# Patient Record
Sex: FEMALE | Race: BLACK OR AFRICAN AMERICAN | NOT HISPANIC OR LATINO | URBAN - METROPOLITAN AREA
[De-identification: names, ages, dates, MRNs, and addresses within clinical notes are randomized per-mention and may not be internally consistent; named-entity substitution may affect disease eponyms.]

---

## 2023-01-06 ENCOUNTER — OFFICE VISIT (OUTPATIENT)
Dept: URBAN - METROPOLITAN AREA CLINIC 113 | Facility: CLINIC | Age: 43
End: 2023-01-06
Payer: OTHER GOVERNMENT

## 2023-01-06 ENCOUNTER — LAB OUTSIDE AN ENCOUNTER (OUTPATIENT)
Dept: URBAN - METROPOLITAN AREA CLINIC 113 | Facility: CLINIC | Age: 43
End: 2023-01-06

## 2023-01-06 ENCOUNTER — WEB ENCOUNTER (OUTPATIENT)
Dept: URBAN - METROPOLITAN AREA CLINIC 113 | Facility: CLINIC | Age: 43
End: 2023-01-06

## 2023-01-06 VITALS
WEIGHT: 159 LBS | RESPIRATION RATE: 20 BRPM | DIASTOLIC BLOOD PRESSURE: 83 MMHG | HEIGHT: 64 IN | BODY MASS INDEX: 27.14 KG/M2 | HEART RATE: 67 BPM | SYSTOLIC BLOOD PRESSURE: 117 MMHG | TEMPERATURE: 97.3 F

## 2023-01-06 DIAGNOSIS — K64.8 INTERNAL HEMORRHOIDS: ICD-10-CM

## 2023-01-06 DIAGNOSIS — K21.9 GERD WITHOUT ESOPHAGITIS: ICD-10-CM

## 2023-01-06 DIAGNOSIS — D50.0 IRON DEFICIENCY ANEMIA DUE TO CHRONIC BLOOD LOSS: ICD-10-CM

## 2023-01-06 DIAGNOSIS — K62.5 RECTAL BLEEDING: ICD-10-CM

## 2023-01-06 PROCEDURE — 99204 OFFICE O/P NEW MOD 45 MIN: CPT | Performed by: INTERNAL MEDICINE

## 2023-01-06 RX ORDER — METHOTREXATE SODIUM 2.5 MG/1
TAKE 6 TABLETS TABLET ORAL
Status: ACTIVE | COMMUNITY

## 2023-01-06 RX ORDER — FOLIC ACID 1 MG/1
1 TABLET TABLET ORAL ONCE A DAY
Status: ACTIVE | COMMUNITY

## 2023-01-06 RX ORDER — PREDNISONE 5 MG/1
1 TABLET TABLET ORAL ONCE A DAY
Status: ACTIVE | COMMUNITY

## 2023-01-06 RX ORDER — UBIDECARENONE 30 MG
AS DIRECTED CAPSULE ORAL
Status: ACTIVE | COMMUNITY

## 2023-01-06 NOTE — HPI-TODAY'S VISIT:
Patient presents today for initial evaluation.  She reports that she has been anemic for a long time.  She has menorrhagia.  She has a period sometimes every 2 or 3 weeks.  She is getting ready to follow-up with OB/GYN regarding uterine fibroids which were found.  She also has rectal bleeding which she attributes to hemorrhoids.  She has never had a GI evaluation.  She has red blood per rectum a few times per month regularly.  This is not associate with hard stools or straining.  She denies any abdominal pain.  No unusual weight loss.  There is no family history of colon cancer.  She has not had a evaluation.  She is taking iron but only a few times a week as she is rather intolerant of it.  She does have acid reflux but this is well controlled with omeprazole.  She denies any swallowing difficulty.

## 2023-01-09 LAB
ABSOLUTE BASOPHILS: 42
ABSOLUTE EOSINOPHILS: 49
ABSOLUTE LYMPHOCYTES: 1449
ABSOLUTE MONOCYTES: 392
ABSOLUTE NEUTROPHILS: 5068
BASOPHILS: 0.6
CBC MORPHOLOGY: (no result)
EOSINOPHILS: 0.7
FERRITIN, SERUM: 17
HEMATOCRIT: 31.2
HEMOGLOBIN: 9.5
LYMPHOCYTES: 20.7
MCH: 21.2
MCHC: 30.4
MCV: 69.6
MONOCYTES: 5.6
MPV: 10
NEUTROPHILS: 72.4
PLATELET COUNT: 375
RDW: 16.1
RED BLOOD CELL COUNT: 4.48
WHITE BLOOD CELL COUNT: 7

## 2023-02-09 ENCOUNTER — OFFICE VISIT (OUTPATIENT)
Dept: URBAN - METROPOLITAN AREA CLINIC 113 | Facility: CLINIC | Age: 43
End: 2023-02-09

## 2023-02-15 ENCOUNTER — CLAIMS CREATED FROM THE CLAIM WINDOW (OUTPATIENT)
Dept: URBAN - METROPOLITAN AREA SURGERY CENTER 25 | Facility: SURGERY CENTER | Age: 43
End: 2023-02-15

## 2023-02-15 ENCOUNTER — CLAIMS CREATED FROM THE CLAIM WINDOW (OUTPATIENT)
Dept: URBAN - METROPOLITAN AREA SURGERY CENTER 25 | Facility: SURGERY CENTER | Age: 43
End: 2023-02-15
Payer: OTHER GOVERNMENT

## 2023-02-15 ENCOUNTER — CLAIMS CREATED FROM THE CLAIM WINDOW (OUTPATIENT)
Dept: URBAN - METROPOLITAN AREA CLINIC 4 | Facility: CLINIC | Age: 43
End: 2023-02-15
Payer: OTHER GOVERNMENT

## 2023-02-15 DIAGNOSIS — D12.8 BENIGN NEOPLASM OF RECTUM: ICD-10-CM

## 2023-02-15 DIAGNOSIS — D12.8 TUBULAR ADENOMA OF RECTUM: ICD-10-CM

## 2023-02-15 DIAGNOSIS — D50.9 IRON DEFICIENCY ANEMIA: ICD-10-CM

## 2023-02-15 PROCEDURE — 88305 TISSUE EXAM BY PATHOLOGIST: CPT | Performed by: PATHOLOGY

## 2023-02-15 PROCEDURE — 45385 COLONOSCOPY W/LESION REMOVAL: CPT | Performed by: INTERNAL MEDICINE

## 2023-02-15 PROCEDURE — G8907 PT DOC NO EVENTS ON DISCHARG: HCPCS | Performed by: INTERNAL MEDICINE

## 2023-02-15 RX ORDER — PREDNISONE 5 MG/1
1 TABLET TABLET ORAL ONCE A DAY
Status: ACTIVE | COMMUNITY

## 2023-02-15 RX ORDER — UBIDECARENONE 30 MG
AS DIRECTED CAPSULE ORAL
Status: ACTIVE | COMMUNITY

## 2023-02-15 RX ORDER — METHOTREXATE SODIUM 2.5 MG/1
TAKE 6 TABLETS TABLET ORAL
Status: ACTIVE | COMMUNITY

## 2023-02-15 RX ORDER — FOLIC ACID 1 MG/1
1 TABLET TABLET ORAL ONCE A DAY
Status: ACTIVE | COMMUNITY

## 2023-03-06 ENCOUNTER — OFFICE VISIT (OUTPATIENT)
Dept: URBAN - METROPOLITAN AREA CLINIC 113 | Facility: CLINIC | Age: 43
End: 2023-03-06
Payer: OTHER GOVERNMENT

## 2023-03-06 ENCOUNTER — DASHBOARD ENCOUNTERS (OUTPATIENT)
Age: 43
End: 2023-03-06

## 2023-03-06 VITALS
DIASTOLIC BLOOD PRESSURE: 83 MMHG | TEMPERATURE: 97.3 F | HEIGHT: 64 IN | WEIGHT: 166 LBS | SYSTOLIC BLOOD PRESSURE: 112 MMHG | HEART RATE: 73 BPM | RESPIRATION RATE: 18 BRPM | BODY MASS INDEX: 28.34 KG/M2

## 2023-03-06 DIAGNOSIS — D12.6 ADENOMATOUS POLYP OF COLON, UNSPECIFIED PART OF COLON: ICD-10-CM

## 2023-03-06 DIAGNOSIS — K64.8 INTERNAL HEMORRHOIDS: ICD-10-CM

## 2023-03-06 DIAGNOSIS — K21.9 GERD WITHOUT ESOPHAGITIS: ICD-10-CM

## 2023-03-06 DIAGNOSIS — D50.0 IRON DEFICIENCY ANEMIA DUE TO CHRONIC BLOOD LOSS: ICD-10-CM

## 2023-03-06 DIAGNOSIS — K62.5 RECTAL BLEEDING: ICD-10-CM

## 2023-03-06 PROBLEM — 266435005: Status: ACTIVE | Noted: 2023-01-06

## 2023-03-06 PROBLEM — 724556004: Status: ACTIVE | Noted: 2023-01-06

## 2023-03-06 PROBLEM — 12063002 RECTAL BLEEDING: Status: ACTIVE | Noted: 2023-01-19

## 2023-03-06 PROCEDURE — 99214 OFFICE O/P EST MOD 30 MIN: CPT | Performed by: INTERNAL MEDICINE

## 2023-03-06 RX ORDER — FOLIC ACID 1 MG/1
1 TABLET TABLET ORAL ONCE A DAY
Status: ACTIVE | COMMUNITY

## 2023-03-06 RX ORDER — FERROUS SULFATE 325(65) MG
1 TABLET TABLET ORAL ONCE A DAY
Status: ACTIVE | COMMUNITY

## 2023-03-06 RX ORDER — OMEPRAZOLE 40 MG/1
1 CAPSULE 30 MINUTES BEFORE MORNING MEAL CAPSULE, DELAYED RELEASE ORAL TWICE A DAY
Status: ACTIVE | COMMUNITY

## 2023-03-06 RX ORDER — METHOTREXATE SODIUM 2.5 MG/1
TAKE 6 TABLETS TABLET ORAL
Status: ACTIVE | COMMUNITY

## 2023-03-06 RX ORDER — PREDNISONE 5 MG/1
1 TABLET TABLET ORAL ONCE A DAY
Status: ACTIVE | COMMUNITY

## 2023-03-06 RX ORDER — UBIDECARENONE 30 MG
AS DIRECTED CAPSULE ORAL
Status: ACTIVE | COMMUNITY

## 2023-03-06 NOTE — HPI-TODAY'S VISIT:
Patient returns today in follow-up.  She has been seen for iron deficiency anemia as well as some rectal bleeding.  Recent colonoscopy showed internal hemorrhoids as well as a tubular villous adenoma.  She reports no difficulties after colonoscopy.  She is taking iron tablets once a day.  She reports menorrhagia and having a period twice a month.  Apparently GYN is considering a hysterectomy.  She denies any black stools.  She is not taking NSAIDs.  Occasionally has to strain given her iron pill but is now taking a stool softener which is helped.  No other new complaints for me today.  Most recent labs I have to review showed a hemoglobin of 9.5 with an MCV of 69.  Her ferritin was 17.

## 2023-03-07 LAB
ABSOLUTE BASOPHILS: 22
ABSOLUTE EOSINOPHILS: 67
ABSOLUTE LYMPHOCYTES: 1613
ABSOLUTE MONOCYTES: 481
ABSOLUTE NEUTROPHILS: 5217
BASOPHILS: 0.3
EOSINOPHILS: 0.9
FERRITIN, SERUM: 3
HEMATOCRIT: 32.4
HEMOGLOBIN: 9.2
LYMPHOCYTES: 21.8
MCH: 20.5
MCHC: 28.4
MCV: 72.2
MONOCYTES: 6.5
MPV: 10.2
NEUTROPHILS: 70.5
PLATELET COUNT: 385
RDW: 16.2
RED BLOOD CELL COUNT: 4.49
WHITE BLOOD CELL COUNT: 7.4

## 2023-03-08 PROBLEM — 87522002 IRON DEFICIENCY ANEMIA: Status: ACTIVE | Noted: 2023-03-08

## 2023-05-08 ENCOUNTER — TELEPHONE ENCOUNTER (OUTPATIENT)
Dept: URBAN - METROPOLITAN AREA CLINIC 113 | Facility: CLINIC | Age: 43
End: 2023-05-08